# Patient Record
Sex: MALE | Race: WHITE | NOT HISPANIC OR LATINO | Employment: FULL TIME | URBAN - NONMETROPOLITAN AREA
[De-identification: names, ages, dates, MRNs, and addresses within clinical notes are randomized per-mention and may not be internally consistent; named-entity substitution may affect disease eponyms.]

---

## 2021-05-04 ENCOUNTER — TRANSCRIBE ORDERS (OUTPATIENT)
Dept: CARDIOLOGY | Facility: CLINIC | Age: 36
End: 2021-05-04

## 2021-05-04 DIAGNOSIS — R07.2 PRECORDIAL PAIN: Primary | ICD-10-CM

## 2022-09-28 ENCOUNTER — OFFICE VISIT (OUTPATIENT)
Dept: FAMILY MEDICINE CLINIC | Facility: CLINIC | Age: 37
End: 2022-09-28

## 2022-09-28 VITALS
DIASTOLIC BLOOD PRESSURE: 88 MMHG | WEIGHT: 180.2 LBS | BODY MASS INDEX: 25.8 KG/M2 | SYSTOLIC BLOOD PRESSURE: 132 MMHG | TEMPERATURE: 97.3 F | HEART RATE: 79 BPM | HEIGHT: 70 IN | OXYGEN SATURATION: 99 %

## 2022-09-28 DIAGNOSIS — Z11.59 ENCOUNTER FOR HCV SCREENING TEST FOR LOW RISK PATIENT: ICD-10-CM

## 2022-09-28 DIAGNOSIS — M54.2 CERVICALGIA: Primary | ICD-10-CM

## 2022-09-28 PROCEDURE — 99214 OFFICE O/P EST MOD 30 MIN: CPT | Performed by: NURSE PRACTITIONER

## 2022-09-28 RX ORDER — PREDNISONE 10 MG/1
10 TABLET ORAL DAILY
Qty: 30 TABLET | Refills: 0 | Status: SHIPPED | OUTPATIENT
Start: 2022-09-28 | End: 2023-03-30

## 2022-09-28 RX ORDER — TRIAMCINOLONE ACETONIDE 40 MG/ML
40 INJECTION, SUSPENSION INTRA-ARTICULAR; INTRAMUSCULAR ONCE
Status: COMPLETED | OUTPATIENT
Start: 2022-09-28 | End: 2022-09-28

## 2022-09-28 RX ADMIN — TRIAMCINOLONE ACETONIDE 40 MG: 40 INJECTION, SUSPENSION INTRA-ARTICULAR; INTRAMUSCULAR at 10:42

## 2022-09-28 NOTE — PROGRESS NOTES
"Chief Complaint  Neck Pain (X1Y) and Headache (X2M)    Subjective        Abundio Garcia presents to [facility]  History of Present Illness He has had chronic neck pain that is located in the center of his neck x 1 year.  For the past month, this pain is causing headaches, fatigue and he states the pain is almost unbearable.  He has not taken anything for pain related to this.  He has no known injury to the area.  He was seen last year by another provider and was sent to PT and he went to several visits and did not get any relief.  He does not take any medications for this.  He does not take these medications. These headaches produce some nausea but no other symptoms such as vomiting or visual changes.     Objective   Vital Signs:  /88 (BP Location: Left arm, Patient Position: Sitting, Cuff Size: Adult)   Pulse 79   Temp 97.3 °F (36.3 °C) (Temporal)   Ht 177.8 cm (70\")   Wt 81.7 kg (180 lb 3.2 oz)   SpO2 99%   BMI 25.86 kg/m²   Estimated body mass index is 25.86 kg/m² as calculated from the following:    Height as of this encounter: 177.8 cm (70\").    Weight as of this encounter: 81.7 kg (180 lb 3.2 oz).          Physical Exam  Constitutional:       Appearance: He is normal weight.   Cardiovascular:      Heart sounds: Normal heart sounds.   Pulmonary:      Effort: Pulmonary effort is normal.      Breath sounds: Normal breath sounds.   Musculoskeletal:         General: No tenderness or signs of injury.      Comments: He has full rom of his neck.  No tenderness with palpation and no local defects noted.    Neurological:      Mental Status: He is alert.        Result Review :                Assessment and Plan   Diagnoses and all orders for this visit:    1. Cervicalgia (Primary)  Assessment & Plan:  Xray of neck .  Continue stretches from PT. Discussed side effects of steroids and benfits.     Orders:  -     XR Neck Soft Tissue; Future  -     predniSONE (DELTASONE) 10 MG " tablet; Take 1 tablet by mouth Daily. 5 po once a day for 2 days, 4 po once a day for 2 days, 3 po once a day for 2 days, 2 po once a day for 2 days, 1 po once a day for 2 days  Dispense: 30 tablet; Refill: 0    2. Encounter for HCV screening test for low risk patient  -     Hepatitis C Antibody           Follow Up   Return in about 1 week (around 10/5/2022) for Recheck.  Patient was given instructions and counseling regarding his condition or for health maintenance advice. Please see specific information pulled into the AVS if appropriate.       Answers for HPI/ROS submitted by the patient on 9/27/2022  What is the primary reason for your visit?: Other  Please describe your symptoms.: Neck Pain.  Have you had these symptoms before?: No  How long have you been having these symptoms?: Greater than 2 weeks  Please list any medications you are currently taking for this condition.: None  Please describe any probable cause for these symptoms. : Unsure

## 2022-10-07 ENCOUNTER — OFFICE VISIT (OUTPATIENT)
Dept: FAMILY MEDICINE CLINIC | Facility: CLINIC | Age: 37
End: 2022-10-07

## 2022-10-07 VITALS
HEIGHT: 70 IN | DIASTOLIC BLOOD PRESSURE: 88 MMHG | WEIGHT: 177.5 LBS | SYSTOLIC BLOOD PRESSURE: 122 MMHG | OXYGEN SATURATION: 98 % | HEART RATE: 84 BPM | BODY MASS INDEX: 25.41 KG/M2 | TEMPERATURE: 98.6 F

## 2022-10-07 DIAGNOSIS — M54.2 CERVICALGIA: Primary | ICD-10-CM

## 2022-10-07 DIAGNOSIS — G44.52 NEW DAILY PERSISTENT HEADACHE: ICD-10-CM

## 2022-10-07 PROCEDURE — 99213 OFFICE O/P EST LOW 20 MIN: CPT | Performed by: NURSE PRACTITIONER

## 2022-10-07 NOTE — PROGRESS NOTES
"Chief Complaint  Neck Pain (F/u)    Subjective        Abundio Garcia presents to [facility]  History of Present Illness We put him on steroids last week for his neck pain but he states that this did not really help his pain. He is having neck pain as well as headaches. His neck xray from last week shows mild facet arthropathy at C7-T1.  He is having new, daily headaches.  He is not taking any medications for this.  He states that he normally does not ever get headaches.  This is new for him.  Nothing has helped the neck pain and headaches.     Objective   Vital Signs:  /88 (BP Location: Left arm, Patient Position: Sitting, Cuff Size: Adult)   Pulse 84   Temp 98.6 °F (37 °C) (Temporal)   Ht 177.8 cm (70\")   Wt 80.5 kg (177 lb 8 oz)   SpO2 98%   BMI 25.47 kg/m²   Estimated body mass index is 25.47 kg/m² as calculated from the following:    Height as of this encounter: 177.8 cm (70\").    Weight as of this encounter: 80.5 kg (177 lb 8 oz).          Physical Exam  Constitutional:       Appearance: Normal appearance.   HENT:      Right Ear: Tympanic membrane, ear canal and external ear normal.      Left Ear: Tympanic membrane, ear canal and external ear normal.   Eyes:      Extraocular Movements: Extraocular movements intact.      Pupils: Pupils are equal, round, and reactive to light.   Cardiovascular:      Rate and Rhythm: Normal rate and regular rhythm.   Pulmonary:      Effort: Pulmonary effort is normal.      Breath sounds: Normal breath sounds.   Musculoskeletal:         General: No swelling or tenderness. Normal range of motion.   Neurological:      General: No focal deficit present.      Mental Status: He is alert and oriented to person, place, and time.   Psychiatric:         Mood and Affect: Mood normal.         Behavior: Behavior normal.         Thought Content: Thought content normal.         Judgment: Judgment normal.        Result Review :                Assessment " and Plan   Diagnoses and all orders for this visit:    1. Cervicalgia (Primary)  Assessment & Plan:  Mri of neck requested.  I have asked him to take NSAIDS for pain as he needs.    Orders:  -     MRI Cervical Spine Without Contrast; Future    2. New daily persistent headache  Assessment & Plan:  MRI of head ordered.       Orders:  -     MRI Brain With & Without Contrast; Future           Follow Up   Return in about 1 week (around 10/14/2022) for Recheck.  Patient was given instructions and counseling regarding his condition or for health maintenance advice. Please see specific information pulled into the AVS if appropriate.       Answers for HPI/ROS submitted by the patient on 10/7/2022  What is the primary reason for your visit?: Other  Please describe your symptoms.: Neck pain, dizziness, headaches, followup after XR  Have you had these symptoms before?: Yes  How long have you been having these symptoms?: Greater than 2 weeks  Please list any medications you are currently taking for this condition.: Prescribed by provider

## 2022-11-21 ENCOUNTER — TELEPHONE (OUTPATIENT)
Dept: FAMILY MEDICINE CLINIC | Facility: CLINIC | Age: 37
End: 2022-11-21

## 2022-11-21 NOTE — TELEPHONE ENCOUNTER
Caller: OSMEL WITH  [employer]     Relationship: [unfilled]     Best call back number: [phone]    What is your medical concern? CALLED STATED THAT PT CERVICAL WAS DENIED BY INSURANCE  AND WANTING FOR HIM TO HAVE 6 WEEKS OF THERAPY, STATED THAT BRAIN WAS AUTHORIZED

## 2022-11-22 ENCOUNTER — APPOINTMENT (OUTPATIENT)
Dept: MRI IMAGING | Facility: HOSPITAL | Age: 37
End: 2022-11-22

## 2023-03-12 ENCOUNTER — HOSPITAL ENCOUNTER (OUTPATIENT)
Dept: MRI IMAGING | Facility: HOSPITAL | Age: 38
Discharge: HOME | End: 2023-03-12
Payer: COMMERCIAL

## 2023-03-12 DIAGNOSIS — G44.52 NEW DAILY PERSISTENT HEADACHE: ICD-10-CM

## 2023-03-12 DIAGNOSIS — M54.2 CERVICALGIA: ICD-10-CM

## 2023-03-12 PROCEDURE — 72141 MRI NECK SPINE W/O DYE: CPT

## 2023-03-12 PROCEDURE — 70551 MRI BRAIN STEM W/O DYE: CPT

## 2023-03-17 ENCOUNTER — TELEPHONE (OUTPATIENT)
Dept: FAMILY MEDICINE CLINIC | Facility: CLINIC | Age: 38
End: 2023-03-17

## 2023-03-17 NOTE — TELEPHONE ENCOUNTER
PATIENT CALLED ABOUT RESULTS OF MRI PERFORMED ON 3/12/23. THE REFERRAL TOOK 6 MO TO GET APPROVED AND SCHEDULED, LAST SEEN BY ELEN ON 11/21/22. NOW PT WANTS TO KNOW HIS NEXT STEP FROM THIS POINT, HIS CONDITIONED HAS NOT IMPROVED AND IS WANTING TO GET INTO PHYSICAL THERAPY.     [phone]

## 2023-03-21 ENCOUNTER — TELEPHONE (OUTPATIENT)
Dept: FAMILY MEDICINE CLINIC | Facility: CLINIC | Age: 38
End: 2023-03-21

## 2023-03-21 NOTE — TELEPHONE ENCOUNTER
HUB TO READ:    I called patient b/c Yelena wants him to sched an appt w/ her so she can re-evaluate his condition and go over his imaging results with him.

## 2023-03-30 ENCOUNTER — OFFICE VISIT (OUTPATIENT)
Dept: FAMILY MEDICINE CLINIC | Facility: CLINIC | Age: 38
End: 2023-03-30
Payer: COMMERCIAL

## 2023-03-30 VITALS
SYSTOLIC BLOOD PRESSURE: 116 MMHG | BODY MASS INDEX: 26.58 KG/M2 | OXYGEN SATURATION: 99 % | HEIGHT: 70 IN | TEMPERATURE: 98.2 F | DIASTOLIC BLOOD PRESSURE: 88 MMHG | HEART RATE: 85 BPM | WEIGHT: 185.7 LBS

## 2023-03-30 DIAGNOSIS — M54.2 CERVICALGIA: Primary | ICD-10-CM

## 2023-03-30 PROCEDURE — 99213 OFFICE O/P EST LOW 20 MIN: CPT | Performed by: NURSE PRACTITIONER

## 2023-03-30 NOTE — PROGRESS NOTES
"Chief Complaint  Neck Pain (F/u)    Subjective        Abundio Garcia presents to [facility]  History of Present Illness  He has been doing a lot of Yoga and stretches but continues to have neck pain. He has recently had an MRI of his neck and brain. Brain MRI is unremarkable. His cervicle spine MRI shows some mild spondylosis and mild to moderate bilateral neuroformaminal narrowing. He is not longer having headaches but states that he is dizzy constantly. He does not take any prescription medications on a daily basis.    Objective   Vital Signs:  /88 (BP Location: Left arm, Patient Position: Sitting, Cuff Size: Adult)   Pulse 85   Temp 98.2 °F (36.8 °C) (Temporal)   Ht 177.8 cm (70\")   Wt 84.2 kg (185 lb 11.2 oz)   SpO2 99%   BMI 26.65 kg/m²   Estimated body mass index is 26.65 kg/m² as calculated from the following:    Height as of this encounter: 177.8 cm (70\").    Weight as of this encounter: 84.2 kg (185 lb 11.2 oz).             Physical Exam  Constitutional:       Appearance: Normal appearance.   Pulmonary:      Effort: Pulmonary effort is normal.      Breath sounds: Normal breath sounds.   Musculoskeletal:         General: No swelling, tenderness or signs of injury.      Comments: He has full rom of his neck w/o difficulty.    Neurological:      Mental Status: He is oriented to person, place, and time.   Psychiatric:         Mood and Affect: Mood normal.         Behavior: Behavior normal.        Result Review :                   Assessment and Plan   Diagnoses and all orders for this visit:    1. Cervicalgia (Primary)  Assessment & Plan:  referral to orthopedics.     Orders:  -     Ambulatory Referral to Orthopedic Surgery           Follow Up   Return if symptoms worsen or fail to improve.  Patient was given instructions and counseling regarding his condition or for health maintenance advice. Please see specific information pulled into the AVS if appropriate. "       Answers for HPI/ROS submitted by the patient on 3/30/2023  What is the primary reason for your visit?: Other  Please describe your symptoms.: Chronic neck pain/dizziness. Followup  Have you had these symptoms before?: Yes  How long have you been having these symptoms?: Greater than 2 weeks  Please list any medications you are currently taking for this condition.: Aleve

## 2023-07-10 ENCOUNTER — OFFICE VISIT (OUTPATIENT)
Dept: FAMILY MEDICINE CLINIC | Facility: CLINIC | Age: 38
End: 2023-07-10
Payer: COMMERCIAL

## 2023-07-21 ENCOUNTER — TELEPHONE (OUTPATIENT)
Dept: FAMILY MEDICINE CLINIC | Facility: CLINIC | Age: 38
End: 2023-07-21
Payer: COMMERCIAL

## 2023-07-21 NOTE — TELEPHONE ENCOUNTER
Patient called about his  MRI referral that was supposed to have been ordered around 2 wks ago. He's consistently tried contacting his WC agent w/ no success. Now his employer is asking about the status of the WC case and he doesn't know what to tell them. He's also worried about his ankle damage long term.     [phone]

## 2023-07-31 ENCOUNTER — TELEPHONE (OUTPATIENT)
Dept: FAMILY MEDICINE CLINIC | Facility: CLINIC | Age: 38
End: 2023-07-31

## 2023-07-31 NOTE — TELEPHONE ENCOUNTER
Caller: Shara Garcia    Relationship: Self    Best call back number: [phone]     What form or medical record are you requesting: X-RAYS FROM [id number]    Who is requesting this form or medical record from you: SHARA    How would you like to receive the form or medical records (pick-up, mail, fax): WILL      Timeframe paperwork needed: ASAP, CALL WHEN READY, HAS AN APPT TO TAKE THEM TO [id number]

## 2024-07-25 ENCOUNTER — OFFICE VISIT (OUTPATIENT)
Dept: FAMILY MEDICINE CLINIC | Facility: CLINIC | Age: 39
End: 2024-07-25
Payer: COMMERCIAL

## 2024-07-25 VITALS
OXYGEN SATURATION: 99 % | SYSTOLIC BLOOD PRESSURE: 108 MMHG | BODY MASS INDEX: 25.15 KG/M2 | DIASTOLIC BLOOD PRESSURE: 86 MMHG | HEART RATE: 92 BPM | WEIGHT: 175.7 LBS | HEIGHT: 70 IN

## 2024-07-25 DIAGNOSIS — H65.03 NON-RECURRENT ACUTE SEROUS OTITIS MEDIA OF BOTH EARS: ICD-10-CM

## 2024-07-25 DIAGNOSIS — J01.40 ACUTE NON-RECURRENT PANSINUSITIS: ICD-10-CM

## 2024-07-25 DIAGNOSIS — Z00.00 ANNUAL PHYSICAL EXAM: Primary | ICD-10-CM

## 2024-07-25 PROCEDURE — 99385 PREV VISIT NEW AGE 18-39: CPT | Performed by: FAMILY MEDICINE

## 2024-07-25 RX ORDER — DOXYCYCLINE 100 MG/1
100 CAPSULE ORAL 2 TIMES DAILY
Qty: 20 CAPSULE | Refills: 0 | Status: SHIPPED | OUTPATIENT
Start: 2024-07-25

## 2024-07-25 RX ORDER — AZELASTINE 1 MG/ML
1 SPRAY, METERED NASAL 2 TIMES DAILY
Qty: 1 EACH | Refills: 0 | Status: SHIPPED | OUTPATIENT
Start: 2024-07-25

## 2024-07-25 RX ORDER — PREDNISONE 10 MG/1
TABLET ORAL
Qty: 21 TABLET | Refills: 0 | Status: SHIPPED | OUTPATIENT
Start: 2024-07-25

## 2024-07-25 RX ORDER — CETIRIZINE HYDROCHLORIDE 10 MG/1
10 TABLET ORAL NIGHTLY
Qty: 30 TABLET | Refills: 0 | Status: SHIPPED | OUTPATIENT
Start: 2024-07-25

## 2024-07-25 NOTE — ASSESSMENT & PLAN NOTE
I suspect this is secondary to sinusitis.  Will place on medication and have patient follow-up in 2 weeks.  If symptoms persist, will refer to ENT for further evaluation.

## 2024-07-25 NOTE — PROGRESS NOTES
New Patient Office Visit      Patient Name: Abundio Garcia  : 1985   MRN: [id number]     Chief Complaint:    Chief Complaint   Patient presents with    [facility]    Annual Exam     Pain in right ear, patient states he went scuba diving and though he ruptured his ear drum. States he felt a pop. Pain hasn't gotten any better since incident.        History of Present Illness: Abundio Garcia is a 38 y.o. male who is here today to establish and to be evaluated for right greater than left ear congestion, ear discomfort, popping.  Patient also states he has had sinus pain/pressure, facial pressure past several days.  Patient has been scuba diving and is concerned he may have a perforated eardrum.  He had lab work completed through his work a few days ago that included cholesterol, testosterone, routine labs.    Subjective      Review of Systems:   Review of Systems   Constitutional:  Negative for fever.   HENT:  Positive for congestion, ear pain, hearing loss, sinus pressure and sinus pain. Negative for ear discharge.    All other systems reviewed and are negative.      Past Medical History:   Past Medical History:   Diagnosis Date    Cholelithiasis        Past Surgical History:   Past Surgical History:   Procedure Laterality Date    ANKLE SURGERY      20 years ago    HERNIA REPAIR         Family History:   Family History   Adopted: Yes       Social History:   Social History     Socioeconomic History    Marital status: Single   Tobacco Use    Smoking status: Never     Passive exposure: Never    Smokeless tobacco: Never   Vaping Use    Vaping status: Never Used   Substance and Sexual Activity    Alcohol use: Yes     Alcohol/week: 2.0 standard drinks of alcohol     Types: 2 Cans of beer per week    Drug use: Never    Sexual activity: Yes     Partners: Female     Birth control/protection: None       Medications:     Current Outpatient Medications:     azelastine (ASTELIN) 0.1 % nasal spray, 1 spray into  "the nostril(s) as directed by provider 2 (Two) Times a Day., Disp: 1 each, Rfl: 0    cetirizine (zyrTEC) 10 MG tablet, Take 1 tablet by mouth Every Night., Disp: 30 tablet, Rfl: 0    doxycycline (MONODOX) 100 MG capsule, Take 1 capsule by mouth 2 (Two) Times a Day., Disp: 20 capsule, Rfl: 0    predniSONE (DELTASONE) 10 MG (21) dose pack, Use as directed on package, Disp: 21 tablet, Rfl: 0    Allergies:   No Known Allergies    Objective     Physical Exam:  Vital Signs:   Vitals:    07/25/24 1303   BP: 108/86   BP Location: Left arm   Patient Position: Sitting   Cuff Size: Adult   Pulse: 92   SpO2: 99%   Weight: 79.7 kg (175 lb 11.2 oz)   Height: 177.8 cm (70\")     Body mass index is 25.21 kg/m².   Facility age limit for growth %jesus is 20 years.    Physical Exam  Vitals and nursing note reviewed.   Constitutional:       General: He is not in acute distress.     Appearance: Normal appearance. He is not ill-appearing or toxic-appearing.   HENT:      Head: Normocephalic and atraumatic.      Right Ear: No drainage or swelling. A middle ear effusion is present. No foreign body. Tympanic membrane is bulging. Tympanic membrane is not injected, perforated or erythematous.      Left Ear: No drainage or swelling. A middle ear effusion is present. No foreign body. Tympanic membrane is bulging. Tympanic membrane is not injected, perforated or erythematous.      Nose: Mucosal edema and congestion present.      Right Sinus: Maxillary sinus tenderness and frontal sinus tenderness present.      Left Sinus: Maxillary sinus tenderness and frontal sinus tenderness present.   Cardiovascular:      Rate and Rhythm: Normal rate and regular rhythm.   Pulmonary:      Effort: Pulmonary effort is normal.      Breath sounds: Normal breath sounds.          Comments: Large nevus noted on the mid of the upper back.  Lymphadenopathy:      Cervical: No cervical adenopathy.   Neurological:      General: No focal deficit present.      Mental Status: " He is alert.   Psychiatric:         Attention and Perception: Attention normal.         Speech: Speech normal.         Behavior: Behavior normal.         Procedures    PHQ-9 Total Score: 0     Assessment / Plan      Assessment/Plan:   Assessment & Plan  Annual physical exam  I recommended patient provide me a copy with his recent labs.  Also recommend patient follow-up with Dr. Ornelas/dermatology for skin check.  Acute non-recurrent pansinusitis  Will treat with doxycycline and prednisone.  Non-recurrent acute serous otitis media of both ears  I suspect this is secondary to sinusitis.  Will place on medication and have patient follow-up in 2 weeks.  If symptoms persist, will refer to ENT for further evaluation.     New Medications Ordered This Visit   Medications    doxycycline (MONODOX) 100 MG capsule     Sig: Take 1 capsule by mouth 2 (Two) Times a Day.     Dispense:  20 capsule     Refill:  0    predniSONE (DELTASONE) 10 MG (21) dose pack     Sig: Use as directed on package     Dispense:  21 tablet     Refill:  0    azelastine (ASTELIN) 0.1 % nasal spray     Si spray into the nostril(s) as directed by provider 2 (Two) Times a Day.     Dispense:  1 each     Refill:  0    cetirizine (zyrTEC) 10 MG tablet     Sig: Take 1 tablet by mouth Every Night.     Dispense:  30 tablet     Refill:  0                   Follow Up:   Return in about 2 weeks (around 2024).      MICHAEL Tim MD  [facility]

## 2025-01-10 ENCOUNTER — OFFICE VISIT (OUTPATIENT)
Dept: FAMILY MEDICINE CLINIC | Facility: CLINIC | Age: 40
End: 2025-01-10
Payer: COMMERCIAL

## 2025-01-10 VITALS
WEIGHT: 183.4 LBS | SYSTOLIC BLOOD PRESSURE: 130 MMHG | HEIGHT: 70 IN | DIASTOLIC BLOOD PRESSURE: 88 MMHG | BODY MASS INDEX: 26.26 KG/M2 | HEART RATE: 92 BPM | OXYGEN SATURATION: 98 %

## 2025-01-10 DIAGNOSIS — H92.03 OTALGIA OF BOTH EARS: Primary | ICD-10-CM

## 2025-01-10 DIAGNOSIS — H65.93 BILATERAL SEROUS OTITIS MEDIA, UNSPECIFIED CHRONICITY: ICD-10-CM

## 2025-01-10 DIAGNOSIS — M47.812 ARTHROPATHY OF CERVICAL SPINE: ICD-10-CM

## 2025-01-10 DIAGNOSIS — M54.2 NECK PAIN: ICD-10-CM

## 2025-01-10 DIAGNOSIS — Z23 NEED FOR INFLUENZA VACCINATION: ICD-10-CM

## 2025-01-10 DIAGNOSIS — J30.9 ALLERGIC RHINITIS, UNSPECIFIED SEASONALITY, UNSPECIFIED TRIGGER: ICD-10-CM

## 2025-01-10 PROCEDURE — 90656 IIV3 VACC NO PRSV 0.5 ML IM: CPT | Performed by: FAMILY MEDICINE

## 2025-01-10 PROCEDURE — 90471 IMMUNIZATION ADMIN: CPT | Performed by: FAMILY MEDICINE

## 2025-01-10 PROCEDURE — 99214 OFFICE O/P EST MOD 30 MIN: CPT | Performed by: FAMILY MEDICINE

## 2025-01-10 RX ORDER — CETIRIZINE HYDROCHLORIDE 10 MG/1
10 TABLET ORAL NIGHTLY
Qty: 90 TABLET | Refills: 1 | Status: SHIPPED | OUTPATIENT
Start: 2025-01-10

## 2025-01-10 RX ORDER — AZELASTINE 1 MG/ML
1 SPRAY, METERED NASAL 2 TIMES DAILY
Qty: 1 EACH | Refills: 1 | Status: SHIPPED | OUTPATIENT
Start: 2025-01-10

## 2025-01-10 RX ORDER — FLUTICASONE PROPIONATE 50 MCG
2 SPRAY, SUSPENSION (ML) NASAL DAILY
Qty: 16 G | Refills: 1 | Status: SHIPPED | OUTPATIENT
Start: 2025-01-10

## 2025-01-10 NOTE — PROGRESS NOTES
Follow Up Office Visit      Patient Name: Abundio Garcia  : 1985   MRN: [id number]     Chief Complaint:    Chief Complaint   Patient presents with    Ear Injury     States his ears are some better but does get sharp pains in his ears    Neck Pain     States he has chronic neck pain that has flared up recently. Pain causes him to get dizzy and almost pass out       History of Present Illness: Abundio Garcia is a 39 y.o. male who is here today for follow up with ear pain and neck pain.  Patient states he feels his peers have not been right since his injury this summer.  Overall they are better but he continues with intermittent pains that occur a few times every day.  He does complain of persistent nasal congestion.  He is interested in a referral to ear nose throat.  Patient also complains of long history of neck pain.  He feels symptoms are worsening.  He has tried physical therapy which has been helpful.  However symptoms seem to be worsening despite stretching and PT.  He does extensive home PT which helps maintain range of motion and pain control.  He is a  and does wear a 10 pound helmet.  Patient did have MRI of the cervical spine in 2023.    Subjective      Review of Systems:   Review of Systems   Constitutional:  Negative for chills, diaphoresis, fatigue and fever.   HENT:  Positive for ear pain. Negative for congestion, sore throat and swollen glands.    Respiratory:  Negative for cough.    Cardiovascular:  Negative for chest pain.   Gastrointestinal:  Positive for nausea. Negative for abdominal pain and vomiting.   Genitourinary:  Negative for dysuria.   Musculoskeletal:  Positive for neck pain. Negative for myalgias.   Skin:  Negative for rash.   Neurological:  Negative for weakness and numbness.       The following portions of the patient's history were reviewed and updated as appropriate: allergies, current medications, past family history, past medical history, past social  "history, past surgical history and problem list.    Medications:     Current Outpatient Medications:     azelastine (ASTELIN) 0.1 % nasal spray, Administer 1 spray into the nostril(s) as directed by provider 2 (Two) Times a Day., Disp: 1 each, Rfl: 1    cetirizine (zyrTEC) 10 MG tablet, Take 1 tablet by mouth Every Night., Disp: 90 tablet, Rfl: 1    fluticasone (FLONASE) 50 MCG/ACT nasal spray, Administer 2 sprays into the nostril(s) as directed by provider Daily., Disp: 16 g, Rfl: 1    Allergies:   No Known Allergies    Objective     Physical Exam:  Vital Signs:   Vitals:    01/10/25 1116   BP: 130/88   BP Location: Right arm   Patient Position: Sitting   Cuff Size: Adult   Pulse: 92   SpO2: 98%   Weight: 83.2 kg (183 lb 6.4 oz)   Height: 177.8 cm (70\")     Body mass index is 26.32 kg/m².   Facility age limit for growth %jesus is 20 years.    Physical Exam  Vitals and nursing note reviewed.   Constitutional:       General: He is not in acute distress.     Appearance: Normal appearance. He is not ill-appearing or toxic-appearing.   HENT:      Head: Normocephalic and atraumatic.      Right Ear: Ear canal normal. A middle ear effusion is present.      Left Ear: Ear canal normal. A middle ear effusion is present.      Nose: Mucosal edema and congestion present.   Neurological:      Mental Status: He is alert.         Procedures    PHQ-9 Total Score:      Assessment / Plan      Assessment/Plan:   Assessment & Plan  Otalgia of both ears  I suspect pain and discomfort are likely secondary to serous otitis.  Patient has not been consistently taking any medicine for his rhinitis.  Will place on cetirizine, azelastine, Flonase.  Patient is concerned because he is going to be going on a trip and does not want his ears to be causing him problems.  He would like a referral to Dr. Centeno.  He would like to see him prior to his trip.  He states if these medications work he will cancel the appointment with Dr. Centeno.  I recommend he be " consistent with these medications.  Orders:    cetirizine (zyrTEC) 10 MG tablet; Take 1 tablet by mouth Every Night.    azelastine (ASTELIN) 0.1 % nasal spray; Administer 1 spray into the nostril(s) as directed by provider 2 (Two) Times a Day.    fluticasone (FLONASE) 50 MCG/ACT nasal spray; Administer 2 sprays into the nostril(s) as directed by provider Daily.    Ambulatory Referral to ENT (Otolaryngology)    Bilateral serous otitis media, unspecified chronicity    Orders:    cetirizine (zyrTEC) 10 MG tablet; Take 1 tablet by mouth Every Night.    azelastine (ASTELIN) 0.1 % nasal spray; Administer 1 spray into the nostril(s) as directed by provider 2 (Two) Times a Day.    fluticasone (FLONASE) 50 MCG/ACT nasal spray; Administer 2 sprays into the nostril(s) as directed by provider Daily.    Ambulatory Referral to ENT (Otolaryngology)    Allergic rhinitis, unspecified seasonality, unspecified trigger    Orders:    cetirizine (zyrTEC) 10 MG tablet; Take 1 tablet by mouth Every Night.    azelastine (ASTELIN) 0.1 % nasal spray; Administer 1 spray into the nostril(s) as directed by provider 2 (Two) Times a Day.    fluticasone (FLONASE) 50 MCG/ACT nasal spray; Administer 2 sprays into the nostril(s) as directed by provider Daily.    Ambulatory Referral to ENT (Otolaryngology)    Neck pain  Patient has persistent symptoms despite physical therapy and home exercise regimen.  MRI from 2023 reviewed and discussed with the patient.  He is interested in seeing pain management to see what other options he may have.  We discussed long-term goals given his occupation.  Orders:    Ambulatory Referral to Pain Management    Arthropathy of cervical spine    Orders:    Ambulatory Referral to Pain Management    Need for influenza vaccination    Orders:    Fluzone >6mos ([id number])                  Follow Up:   Return if symptoms worsen or fail to improve.      MICHAEL Tim MD  [facility]

## 2025-05-22 ENCOUNTER — TELEPHONE (OUTPATIENT)
Dept: FAMILY MEDICINE CLINIC | Facility: CLINIC | Age: 40
End: 2025-05-22
Payer: COMMERCIAL

## 2025-05-22 NOTE — TELEPHONE ENCOUNTER
Caller: Abundio Garcia    Relationship: Self    Best call back number: [phone]    What is the medical concern/diagnosis: BULGING NECK DISC     What specialty or service is being requested: PAIN MANAGEMENT     What is the provider, practice or medical service name:     What is the office location:     What is the office phone number:     Any additional details:

## 2025-05-27 NOTE — TELEPHONE ENCOUNTER
Name: Abundio Garcia    Relationship: Self    Best Callback Number: [phone]     HUB PROVIDED THE RELAY MESSAGE FROM THE OFFICE   PATIENT VOICED UNDERSTANDING AND HAS NO FURTHER QUESTIONS AT THIS TIME    ADDITIONAL INFORMATION: PATIENT STATES THAT HE DID NOT GO TO THAT APPOINTMENT, BUT WILL CALL AND SEE IF THEY CAN GET HIM IN.

## 2025-05-27 NOTE — TELEPHONE ENCOUNTER
HUB TO RELAY: Attempted to contact pt; we did pain mgmt referral for him in January. Did he ever go? Does he still need referral or if he did go, he should be able to call their office for an appt. LVM to call office to let us know

## 2025-05-28 ENCOUNTER — OFFICE VISIT (OUTPATIENT)
Dept: PAIN MEDICINE | Facility: CLINIC | Age: 40
End: 2025-05-28
Payer: COMMERCIAL

## 2025-05-28 VITALS — BODY MASS INDEX: 26.34 KG/M2 | WEIGHT: 184 LBS | HEIGHT: 70 IN

## 2025-05-28 DIAGNOSIS — G89.4 CHRONIC PAIN SYNDROME: ICD-10-CM

## 2025-05-28 DIAGNOSIS — M47.812 CERVICAL SPONDYLOSIS WITHOUT MYELOPATHY: Primary | ICD-10-CM

## 2025-05-28 PROCEDURE — 99204 OFFICE O/P NEW MOD 45 MIN: CPT | Performed by: STUDENT IN AN ORGANIZED HEALTH CARE EDUCATION/TRAINING PROGRAM

## 2025-05-28 NOTE — PROGRESS NOTES
Referring Physician: Ferdinand Tim MD  [facility] [place] [address]    Primary Physician: Ferdinand Tim MD    CHIEF COMPLAINT or REASON FOR VISIT: Neck Pain (New patient)      Initial history of present illness on 05/28/2025:  Mr. Abundio Garcia is 39 y.o. male who presents as a new patient referral for evaluation and treatment of chronic left-sided neck pain.  Patient has an approximately 4-year history of this condition without specific inciting event or trauma.  He identifies a discomfort within the left cervical spine.  It is worst when sitting at rest, ameliorated with extension or flexion.  Traction was helpful at physical therapy.  He is tried NSAIDs without benefit.  He denies any radiation into the upper extremities.  Denies any history of spinal surgery intervention.  He was evaluated by spine surgery at [facility] with no recommendations for surgery made.  Denies any upper extremity weakness.  He is a .    Interval history:    Interventions:      Objective Pain Scoring:   BRIEF PAIN INVENTORY:  Total score:   Pain Score    05/28/25 1441   PainSc: 4    PainLoc: Neck      PHQ-2: 0  PHQ-9:    Opioid Risk Tool:         Review of Systems:   ROS negative except as otherwise noted     Past Medical History:   Past Medical History:   Diagnosis Date    Cervical disc disorder 2023    Cholelithiasis 5/24    Chronic pain disorder 2023    Fractures     Broken ankle/2004    Neck pain 2023         Past Surgical History:   Past Surgical History:   Procedure Laterality Date    ANKLE SURGERY      20 years ago    CHOLECYSTECTOMY  01/2025    approximate date    FRACTURE SURGERY  2004    Ankle    HERNIA REPAIR           Family History   Family History   Adopted: Yes         Social History   Social History     Socioeconomic History    Marital status: Single   Tobacco Use    Smoking status: Never     Passive exposure: Never    Smokeless tobacco: Never   Vaping Use    Vaping  "status: Never Used   Substance and Sexual Activity    Alcohol use: Not Currently     Alcohol/week: 2.0 standard drinks of alcohol     Types: 2 Cans of beer per week    Drug use: Never    Sexual activity: Yes     Partners: Female     Birth control/protection: None        Medications:     Current Outpatient Medications:     azelastine (ASTELIN) 0.1 % nasal spray, Administer 1 spray into the nostril(s) as directed by provider 2 (Two) Times a Day., Disp: 1 each, Rfl: 1    cetirizine (zyrTEC) 10 MG tablet, Take 1 tablet by mouth Every Night., Disp: 90 tablet, Rfl: 1    fluticasone (FLONASE) 50 MCG/ACT nasal spray, Administer 2 sprays into the nostril(s) as directed by provider Daily., Disp: 16 g, Rfl: 1        Physical Exam:     Vitals:    05/28/25 1441   Weight: 83.5 kg (184 lb)   Height: 177.8 cm (70\")   PainSc: 4    PainLoc: Neck        General: Alert and oriented, No acute distress.   HEENT: Normocephalic, atraumatic.   Cardiovascular: No gross edema  Respiratory: Respirations are non-labored    Cervical Spine:   No masses or atrophy  Range of motion - Flexion normal. Extension normal. Lateral rotation normal.   Palpation -tender left  Spurling's - negative     Motor Exam:    Strength: Rate on 1-5 scale Right Left    C5-Elbow flexion, Deltoid 5/5  5/5    C6-Wrist extension 5/5  5/5    C7- Elbow / finger extension 5/5  5/5    C8- Finger flexion 5/5  5/5    T1- Intrinsics hand 5/5  5/5    Strength: Rate on 1-5 scale Right Left    L1/2- hip flexion 5/5  5/5    L3- knee extension 5/5  5/5    L4- ankle dorsiflexion 5/5  5/5    L5- great toe extension 5/5  5/5    S1- ankle plantarflexion 5/5  5/5    Sensory Exam: Full and equal sensation to light touch throughout.       Neurologic: Cranial Nerves II-XII are grossly intact.     Psychiatric: Cooperative.   Gait: Normal   Assistive Devices: None    Imaging Studies:   No results found for this or any previous visit.        Independent review of radiographic imaging: " Available for my review/interpretation is a cervical MRI dated March 12, 2023 demonstrating mild degenerative disc disease with broad disc bulging at C5-6 and C6-7 without neuroforaminal or canal stenosis.  There is a bony tubercle extending anteriorly from the left C6-7 facet    Impression & Plan:       05/28/2025: Abundio Garcia is a 39 y.o. male with no significant past medical history who presents to the pain clinic for evaluation and treatment of chronic left-sided neck pain.  Lumbar MRI and radiographs demonstrate increased bony bridging/tubercle from C5-C7.  I would like to obtain CT scan of the cervical spine for better evaluation.  Additionally discussed left-sided cervical medial branch blocks at C5/C6 and C6/C7.  I had a discussion with the patient regarding the risks of the procedure including bleeding, infection, damage to surrounding structures.    1. Cervical spondylosis without myelopathy    2. Chronic pain syndrome        PLAN:  1. Medications:      2. Physical Therapy: PT referral given today    3. Psychological: defer    4. Complementary and alternative (CAM) Therapies:     5. Labs/Diagnostic studies: None indicated     6. Imaging: MRI independently interpreted and reviewed with patient.   I did personally discuss imaging findings with Dr. Peguero who recommended CT scan for better characterization of findings.  Will obtain cervical CT scan for better characterization of C5-C7 left-sided facet bony structure.     7. Interventions: Schedule left C5/C6 and C6/C7 medial branch blocks.  If the first blocks provide diagnostic relief will schedule a second set of medial branch blocks.  If second set of medial branch blocks provides diagnostic relief will schedule rhizotomy.    8. Referrals: None indicated     9. Records: n/a    10. Lifestyle goals:    Follow-up 1 month with [facility]  Ludin Forrester MD          Quality Metrics:                Please note  that portions of this note were completed with a voice recognition program.      Any copied data in any portion of my note from previous notes included in the HPI, PE, MDM and/or assessment and plan has been reviewed by myself and accurate as of this date.      The 21st Century Cures Act makes medical notes like this available to patients in the interest of transparency. This is a medical document intended as peer to peer communication. It is written in medical language and may contain abbreviations or verbiage that are unfamiliar. It may appear blunt or direct. Medical documents are intended to carry relevant information, facts as evident, and the clinical opinion of the practitioner.

## 2025-06-06 ENCOUNTER — TRANSCRIBE ORDERS (OUTPATIENT)
Dept: CARDIOLOGY | Age: 40
End: 2025-06-06
Payer: COMMERCIAL

## 2025-06-06 DIAGNOSIS — Z00.00 PHYSICAL EXAM: Primary | ICD-10-CM

## 2025-06-12 ENCOUNTER — TELEPHONE (OUTPATIENT)
Dept: CARDIOLOGY | Age: 40
End: 2025-06-12
Payer: COMMERCIAL

## 2025-06-16 ENCOUNTER — HOSPITAL ENCOUNTER (OUTPATIENT)
Dept: CARDIOLOGY | Facility: HOSPITAL | Age: 40
Discharge: HOME | End: 2025-06-16

## 2025-06-16 DIAGNOSIS — Z00.00 PHYSICAL EXAM: ICD-10-CM

## 2025-06-16 LAB
BH CV STRESS BP STAGE 1: NORMAL
BH CV STRESS BP STAGE 2: NORMAL
BH CV STRESS BP STAGE 3: NORMAL
BH CV STRESS DURATION MIN STAGE 1: 3
BH CV STRESS DURATION MIN STAGE 2: 3
BH CV STRESS DURATION MIN STAGE 3: 3
BH CV STRESS DURATION MIN STAGE 4: 1
BH CV STRESS DURATION SEC STAGE 1: 0
BH CV STRESS DURATION SEC STAGE 2: 0
BH CV STRESS DURATION SEC STAGE 3: 0
BH CV STRESS DURATION SEC STAGE 4: 12
BH CV STRESS GRADE STAGE 1: 10
BH CV STRESS GRADE STAGE 2: 12
BH CV STRESS GRADE STAGE 3: 14
BH CV STRESS GRADE STAGE 4: 16
BH CV STRESS HR STAGE 1: 129
BH CV STRESS HR STAGE 2: 141
BH CV STRESS HR STAGE 3: 150
BH CV STRESS HR STAGE 4: 171
BH CV STRESS METS STAGE 1: 5
BH CV STRESS METS STAGE 2: 7.5
BH CV STRESS METS STAGE 3: 10
BH CV STRESS METS STAGE 4: 12.1
BH CV STRESS PROTOCOL 1: NORMAL
BH CV STRESS RECOVERY BP: NORMAL MMHG
BH CV STRESS RECOVERY HR: 109 BPM
BH CV STRESS SPEED STAGE 1: 1.7
BH CV STRESS SPEED STAGE 2: 2.5
BH CV STRESS SPEED STAGE 3: 3.4
BH CV STRESS SPEED STAGE 4: 4.2
BH CV STRESS STAGE 1: 1
BH CV STRESS STAGE 2: 2
BH CV STRESS STAGE 3: 3
BH CV STRESS STAGE 4: 4
MAXIMAL PREDICTED HEART RATE: 180 BPM
PERCENT MAX PREDICTED HR: 95 %
STRESS BASELINE BP: NORMAL MMHG
STRESS BASELINE HR: 108 BPM
STRESS PERCENT HR: 112 %
STRESS POST ESTIMATED WORKLOAD: 12.1 METS
STRESS POST EXERCISE DUR MIN: 10 MIN
STRESS POST EXERCISE DUR SEC: 12 SEC
STRESS POST PEAK BP: NORMAL MMHG
STRESS POST PEAK HR: 171 BPM
STRESS TARGET HR: 153 BPM

## 2025-06-16 PROCEDURE — 93017 CV STRESS TEST TRACING ONLY: CPT

## 2025-06-16 PROCEDURE — 93018 CV STRESS TEST I&R ONLY: CPT | Performed by: STUDENT IN AN ORGANIZED HEALTH CARE EDUCATION/TRAINING PROGRAM

## 2025-06-16 PROCEDURE — 93016 CV STRESS TEST SUPVJ ONLY: CPT | Performed by: STUDENT IN AN ORGANIZED HEALTH CARE EDUCATION/TRAINING PROGRAM
